# Patient Record
Sex: FEMALE | Race: OTHER
[De-identification: names, ages, dates, MRNs, and addresses within clinical notes are randomized per-mention and may not be internally consistent; named-entity substitution may affect disease eponyms.]

---

## 2017-04-02 ENCOUNTER — RESULT REVIEW (OUTPATIENT)
Age: 77
End: 2017-04-02

## 2017-04-03 PROBLEM — Z00.00 ENCOUNTER FOR PREVENTIVE HEALTH EXAMINATION: Status: ACTIVE | Noted: 2017-04-03

## 2017-04-05 ENCOUNTER — APPOINTMENT (OUTPATIENT)
Dept: CT IMAGING | Facility: CLINIC | Age: 77
End: 2017-04-05

## 2018-04-04 ENCOUNTER — TRANSCRIPTION ENCOUNTER (OUTPATIENT)
Age: 78
End: 2018-04-04

## 2018-11-19 ENCOUNTER — APPOINTMENT (OUTPATIENT)
Dept: PULMONOLOGY | Facility: CLINIC | Age: 78
End: 2018-11-19
Payer: MEDICARE

## 2018-11-19 ENCOUNTER — LABORATORY RESULT (OUTPATIENT)
Age: 78
End: 2018-11-19

## 2018-11-19 PROCEDURE — 94727 GAS DIL/WSHOT DETER LNG VOL: CPT

## 2018-11-19 PROCEDURE — 36415 COLL VENOUS BLD VENIPUNCTURE: CPT

## 2018-11-19 PROCEDURE — 88738 HGB QUANT TRANSCUTANEOUS: CPT

## 2018-11-19 PROCEDURE — 99204 OFFICE O/P NEW MOD 45 MIN: CPT | Mod: 25

## 2018-11-19 PROCEDURE — 95012 NITRIC OXIDE EXP GAS DETER: CPT

## 2018-11-19 PROCEDURE — 94060 EVALUATION OF WHEEZING: CPT

## 2018-11-19 PROCEDURE — 94729 DIFFUSING CAPACITY: CPT

## 2018-11-19 NOTE — HISTORY OF PRESENT ILLNESS
[FreeTextEntry1] : Lyndsay is a pleasant 78-year-old female with history of hiatal hernia surgery in 2016, she came in with complaint of weight loss of 10 pounds over the last 2 years, for which she underwent chest/abdomen/pelvis CT scan by you recently. Chest CT scan was found to have evidence of atelectasis. She currently appears comfortable. She complains of decreased p.o. intake/poor appetite, but she has no chest pain, shortness of breath, cough, fever or chills.

## 2018-11-19 NOTE — PROCEDURE
[FreeTextEntry1] : Pulmonary Function Test: Lung Volume: Within normal limits; Spirometry: Within normal limits with no improvement post bronchodilator; Diffusion: Within normal limits.\par \par Exhaled nitric oxide level was found to be 24 PPB.

## 2018-11-19 NOTE — ASSESSMENT
[FreeTextEntry1] : I collected serum quantitative serum TB test, ACE, ESR, etc. for further evaluation. I am ordering a PET scan for further evaluation. I advised Lyndsay to return to the office for followup visit after the PET scan has been performed.

## 2018-11-19 NOTE — PHYSICAL EXAM
[General Appearance - Well Developed] : well developed [Normal Appearance] : normal appearance [Well Groomed] : well groomed [General Appearance - Well Nourished] : well nourished [No Deformities] : no deformities [General Appearance - In No Acute Distress] : no acute distress [Neck Appearance] : the appearance of the neck was normal [Neck Cervical Mass (___cm)] : no neck mass was observed [Jugular Venous Distention Increased] : there was no jugular-venous distention [Thyroid Diffuse Enlargement] : the thyroid was not enlarged [Thyroid Nodule] : there were no palpable thyroid nodules [Heart Rate And Rhythm] : heart rate and rhythm were normal [Heart Sounds] : normal S1 and S2 [Murmurs] : no murmurs present [Respiration, Rhythm And Depth] : normal respiratory rhythm and effort [Exaggerated Use Of Accessory Muscles For Inspiration] : no accessory muscle use [Auscultation Breath Sounds / Voice Sounds] : lungs were clear to auscultation bilaterally [Abdomen Soft] : soft [Abdomen Tenderness] : non-tender [Abdomen Mass (___ Cm)] : no abdominal mass palpated [Nail Clubbing] : no clubbing of the fingernails [Cyanosis, Localized] : no localized cyanosis [Petechial Hemorrhages (___cm)] : no petechial hemorrhages [] : no ischemic changes

## 2018-11-19 NOTE — REVIEW OF SYSTEMS
[Poor Appetite] : poor appetite [Recent Wt Loss (___ Lbs)] : recent [unfilled] ~Ulb weight loss [Negative] : Sleep Disorder

## 2018-11-19 NOTE — DISCUSSION/SUMMARY
[FreeTextEntry1] : Lyndsay is a patient with history of weight loss and lung atelectasis shown on the current chest CT scan, rule out past TB exposure, rule out sarcoidosis/SLE, rule out malignancy, though malignancy is unlikely

## 2018-11-19 NOTE — CONSULT LETTER
[Dear  ___] : Dear  [unfilled], [Courtesy Letter:] : I had the pleasure of seeing your patient, [unfilled], in my office today. [Please see my note below.] : Please see my note below. [Consult Closing:] : Thank you very much for allowing me to participate in the care of this patient.  If you have any questions, please do not hesitate to contact me. [Sincerely,] : Sincerely, [FreeTextEntry3] : Dr. Asha Randall

## 2018-11-25 LAB
ACE BLD-CCNC: 29 U/L
ANA SER IF-ACNC: NEGATIVE
CK SERPL-CCNC: 67 U/L
CRP SERPL-MCNC: <0.1 MG/DL
ENA JO1 AB SER IA-ACNC: <0.2 AL
ENA SCL70 IGG SER IA-ACNC: <0.2 AL
ERYTHROCYTE [SEDIMENTATION RATE] IN BLOOD BY WESTERGREN METHOD: 32 MM/HR
HIV1+2 AB SPEC QL IA.RAPID: NONREACTIVE
HP PNL SER: NORMAL
M TB IFN-G BLD-IMP: NEGATIVE
MPO AB + PR3 PNL SER: NORMAL
QUANTIFERON TB PLUS MITOGEN MINUS NIL: 7.15 IU/ML
QUANTIFERON TB PLUS NIL: 0.02 IU/ML
QUANTIFERON TB PLUS TB1 MINUS NIL: 0 IU/ML
QUANTIFERON TB PLUS TB2 MINUS NIL: 0 IU/ML
RHEUMATOID FACT SER QL: 11 IU/ML

## 2018-12-18 ENCOUNTER — APPOINTMENT (OUTPATIENT)
Dept: PULMONOLOGY | Facility: CLINIC | Age: 78
End: 2018-12-18
Payer: MEDICARE

## 2018-12-18 VITALS
DIASTOLIC BLOOD PRESSURE: 56 MMHG | SYSTOLIC BLOOD PRESSURE: 135 MMHG | OXYGEN SATURATION: 99 % | HEART RATE: 88 BPM | BODY MASS INDEX: 21.05 KG/M2 | WEIGHT: 131 LBS | HEIGHT: 66 IN | TEMPERATURE: 97.7 F | RESPIRATION RATE: 16 BRPM

## 2018-12-18 PROCEDURE — 99213 OFFICE O/P EST LOW 20 MIN: CPT

## 2019-06-18 ENCOUNTER — APPOINTMENT (OUTPATIENT)
Dept: PULMONOLOGY | Facility: CLINIC | Age: 79
End: 2019-06-18

## 2019-06-24 ENCOUNTER — APPOINTMENT (OUTPATIENT)
Dept: PULMONOLOGY | Facility: CLINIC | Age: 79
End: 2019-06-24
Payer: MEDICARE

## 2019-06-24 VITALS
BODY MASS INDEX: 21.21 KG/M2 | OXYGEN SATURATION: 98 % | SYSTOLIC BLOOD PRESSURE: 125 MMHG | WEIGHT: 132 LBS | HEART RATE: 78 BPM | HEIGHT: 66 IN | RESPIRATION RATE: 15 BRPM | DIASTOLIC BLOOD PRESSURE: 68 MMHG | TEMPERATURE: 98.7 F

## 2019-06-24 PROCEDURE — 94060 EVALUATION OF WHEEZING: CPT

## 2019-06-24 PROCEDURE — 94727 GAS DIL/WSHOT DETER LNG VOL: CPT

## 2019-06-24 PROCEDURE — 94729 DIFFUSING CAPACITY: CPT

## 2019-06-24 PROCEDURE — 88738 HGB QUANT TRANSCUTANEOUS: CPT

## 2019-06-24 PROCEDURE — 99214 OFFICE O/P EST MOD 30 MIN: CPT | Mod: 25

## 2019-06-24 PROCEDURE — 95012 NITRIC OXIDE EXP GAS DETER: CPT

## 2019-06-24 RX ORDER — GLIPIZIDE 10 MG/1
10 TABLET ORAL
Refills: 0 | Status: ACTIVE | COMMUNITY
Start: 2019-06-24

## 2019-06-24 RX ORDER — SITAGLIPTIN AND METFORMIN HYDROCHLORIDE 100; 1000 MG/1; MG/1
100-1000 TABLET, FILM COATED, EXTENDED RELEASE ORAL
Refills: 0 | Status: ACTIVE | COMMUNITY
Start: 2019-06-24

## 2019-06-24 RX ORDER — PANTOPRAZOLE 40 MG/1
40 TABLET, DELAYED RELEASE ORAL
Refills: 0 | Status: ACTIVE | COMMUNITY
Start: 2019-06-24

## 2019-06-24 RX ORDER — SIMVASTATIN 10 MG/1
10 TABLET, FILM COATED ORAL
Refills: 0 | Status: ACTIVE | COMMUNITY
Start: 2019-06-24

## 2019-06-24 RX ORDER — PIOGLITAZONE HYDROCHLORIDE AND METFORMIN HYDROCHLORIDE 15; 500 MG/1; MG/1
15-500 TABLET, FILM COATED ORAL
Refills: 0 | Status: ACTIVE | COMMUNITY
Start: 2019-06-24

## 2019-06-24 RX ORDER — LOSARTAN POTASSIUM 25 MG/1
25 TABLET, FILM COATED ORAL
Refills: 0 | Status: ACTIVE | COMMUNITY
Start: 2019-06-24

## 2019-06-24 RX ORDER — CARVEDILOL 12.5 MG/1
12.5 TABLET, FILM COATED ORAL
Refills: 0 | Status: ACTIVE | COMMUNITY
Start: 2019-06-24

## 2019-06-24 NOTE — PHYSICAL EXAM
[General Appearance - Well Developed] : well developed [Normal Appearance] : normal appearance [Well Groomed] : well groomed [General Appearance - Well Nourished] : well nourished [No Deformities] : no deformities [General Appearance - In No Acute Distress] : no acute distress [Normal Conjunctiva] : the conjunctiva exhibited no abnormalities [Eyelids - No Xanthelasma] : the eyelids demonstrated no xanthelasmas [Neck Appearance] : the appearance of the neck was normal [Normal Oropharynx] : normal oropharynx [Neck Cervical Mass (___cm)] : no neck mass was observed [Thyroid Diffuse Enlargement] : the thyroid was not enlarged [Jugular Venous Distention Increased] : there was no jugular-venous distention [Heart Rate And Rhythm] : heart rate and rhythm were normal [Thyroid Nodule] : there were no palpable thyroid nodules [Heart Sounds] : normal S1 and S2 [Murmurs] : no murmurs present [Auscultation Breath Sounds / Voice Sounds] : lungs were clear to auscultation bilaterally [Exaggerated Use Of Accessory Muscles For Inspiration] : no accessory muscle use [Respiration, Rhythm And Depth] : normal respiratory rhythm and effort [Abdomen Tenderness] : non-tender [Abdomen Soft] : soft [Abnormal Walk] : normal gait [Abdomen Mass (___ Cm)] : no abdominal mass palpated [Cyanosis, Localized] : no localized cyanosis [Nail Clubbing] : no clubbing of the fingernails [Gait - Sufficient For Exercise Testing] : the gait was sufficient for exercise testing [Petechial Hemorrhages (___cm)] : no petechial hemorrhages [] : no rash [No Venous Stasis] : no venous stasis [Skin Color & Pigmentation] : normal skin color and pigmentation [Skin Lesions] : no skin lesions [No Skin Ulcers] : no skin ulcer [No Xanthoma] : no  xanthoma was observed [Sensation] : the sensory exam was normal to light touch and pinprick [Deep Tendon Reflexes (DTR)] : deep tendon reflexes were 2+ and symmetric [No Focal Deficits] : no focal deficits [Impaired Insight] : insight and judgment were intact [Oriented To Time, Place, And Person] : oriented to person, place, and time [Affect] : the affect was normal

## 2019-06-24 NOTE — PROCEDURE
[FreeTextEntry1] : Pulmonary Function Test: Lung Volume: Within normal limits; Spirometry: Within normal limits with no improvement post bronchodilator; Diffusion: Within normal limits.\par \par Exhaled nitric oxide level is 36  PPB\par

## 2019-07-01 LAB — POCT - HEMOGLOBIN (HGB), QUANTITATIVE, TRANSCUTANEOUS: 8.8

## 2019-12-16 ENCOUNTER — APPOINTMENT (OUTPATIENT)
Dept: PULMONOLOGY | Facility: CLINIC | Age: 79
End: 2019-12-16
Payer: MEDICARE

## 2019-12-16 VITALS
WEIGHT: 132 LBS | SYSTOLIC BLOOD PRESSURE: 134 MMHG | DIASTOLIC BLOOD PRESSURE: 64 MMHG | HEART RATE: 67 BPM | HEIGHT: 66 IN | BODY MASS INDEX: 21.21 KG/M2 | OXYGEN SATURATION: 99 % | RESPIRATION RATE: 16 BRPM

## 2019-12-16 PROCEDURE — 94729 DIFFUSING CAPACITY: CPT

## 2019-12-16 PROCEDURE — 88738 HGB QUANT TRANSCUTANEOUS: CPT

## 2019-12-16 PROCEDURE — 94060 EVALUATION OF WHEEZING: CPT

## 2019-12-16 PROCEDURE — 94727 GAS DIL/WSHOT DETER LNG VOL: CPT

## 2019-12-16 PROCEDURE — 95012 NITRIC OXIDE EXP GAS DETER: CPT

## 2019-12-16 PROCEDURE — 99214 OFFICE O/P EST MOD 30 MIN: CPT | Mod: 25

## 2019-12-17 NOTE — ASSESSMENT
[FreeTextEntry1] : Repeat chest CT scan for followup.\par Return to the office for followup visit post CT scan

## 2019-12-17 NOTE — PROCEDURE
[FreeTextEntry1] : Pulmonary Function Test: Lung Volume: Within normal limits; Spirometry: Within normal limits with no improvement post bronchodilator; Diffusion: moderate impairment.\par \par Exhaled nitric oxide level is 29  PPB\par

## 2019-12-27 ENCOUNTER — APPOINTMENT (OUTPATIENT)
Dept: PULMONOLOGY | Facility: CLINIC | Age: 79
End: 2019-12-27
Payer: MEDICARE

## 2019-12-27 VITALS
DIASTOLIC BLOOD PRESSURE: 70 MMHG | SYSTOLIC BLOOD PRESSURE: 143 MMHG | WEIGHT: 132 LBS | OXYGEN SATURATION: 99 % | HEIGHT: 66 IN | HEART RATE: 76 BPM | RESPIRATION RATE: 15 BRPM | TEMPERATURE: 98.5 F | BODY MASS INDEX: 21.21 KG/M2

## 2019-12-27 DIAGNOSIS — R05 COUGH: ICD-10-CM

## 2019-12-27 DIAGNOSIS — R91.8 OTHER NONSPECIFIC ABNORMAL FINDING OF LUNG FIELD: ICD-10-CM

## 2019-12-27 DIAGNOSIS — J98.11 ATELECTASIS: ICD-10-CM

## 2019-12-27 PROCEDURE — 99214 OFFICE O/P EST MOD 30 MIN: CPT

## 2019-12-30 PROBLEM — R05 COUGH: Status: ACTIVE | Noted: 2019-12-17

## 2019-12-30 PROBLEM — J98.11 ATELECTASIS: Status: ACTIVE | Noted: 2018-11-19

## 2019-12-30 PROBLEM — R91.8 ABNORMAL CT SCAN, LUNG: Status: ACTIVE | Noted: 2018-11-19

## 2019-12-30 NOTE — PROCEDURE
[FreeTextEntry1] : Chest CT scan on December 20, 2019 reviewed with patient at length in the office today

## 2019-12-30 NOTE — PHYSICAL EXAM
[Normal Appearance] : normal appearance [General Appearance - Well Developed] : well developed [Well Groomed] : well groomed [General Appearance - Well Nourished] : well nourished [No Deformities] : no deformities [General Appearance - In No Acute Distress] : no acute distress [Neck Appearance] : the appearance of the neck was normal [Neck Cervical Mass (___cm)] : no neck mass was observed [Jugular Venous Distention Increased] : there was no jugular-venous distention [Thyroid Nodule] : there were no palpable thyroid nodules [Heart Rate And Rhythm] : heart rate and rhythm were normal [Thyroid Diffuse Enlargement] : the thyroid was not enlarged [Murmurs] : no murmurs present [Heart Sounds] : normal S1 and S2 [Respiration, Rhythm And Depth] : normal respiratory rhythm and effort [Abdomen Soft] : soft [Auscultation Breath Sounds / Voice Sounds] : lungs were clear to auscultation bilaterally [Exaggerated Use Of Accessory Muscles For Inspiration] : no accessory muscle use [Abdomen Tenderness] : non-tender [Abdomen Mass (___ Cm)] : no abdominal mass palpated [Nail Clubbing] : no clubbing of the fingernails [Cyanosis, Localized] : no localized cyanosis [Petechial Hemorrhages (___cm)] : no petechial hemorrhages [] : no ischemic changes